# Patient Record
(demographics unavailable — no encounter records)

---

## 2025-07-21 NOTE — ASSESSMENT
[FreeTextEntry1] : Impression: Lumbar radiculitis mild degenerative hip arthritis.  This patient will continue the home exercise program I do not think physical therapy is necessary at this point.  She has been placed on diclofenac with GI precautions and will return on a as needed basis.  With regards to her hands no active treatment is noted she has been made aware he is a mild degenerative changes as result of mild arthritic degeneration

## 2025-07-21 NOTE — PHYSICAL EXAM
[de-identified] : Exam today reveals straight spine level pelvis no leg length discrepancy and a normal gait without limp she has reasonable motion to the lumbar spine with discomfort at the limits.  Spasm is noted on both sides mild no trigger points present.  Both hips have supple and good motion mild discomfort over the left anterior hip capsule.  Straight leg raising is negative she is neurologically intact.  With regards to her hand she has full motion on both sides wrist and all fingers and thumb she does have nodules Heberden's nodules all consistent with degenerative changes there is no significant pain is noted and she has excellent  strength.  X-rays ordered and taken today of the lumbar spine reveal mild degenerative changes no significant lesion mild facet arthritis.  X-ray of the left hip mild degenerative change noted as well.

## 2025-07-21 NOTE — HISTORY OF PRESENT ILLNESS
[de-identified] : This 58-year-old healthy pleasant lady seen today for evaluation of her back and hips.  She has had discomfort on/off for 1 year.  She has been treated with therapy in the past which helped somewhat in part.  She has no functional disability she does well albeit minor discomfort on today's visit worse of the left hip.  Also wanted evaluation of her hands where she has painless nodules in the small joints.  Past history is otherwise noncontributory.